# Patient Record
Sex: MALE | Race: WHITE
[De-identification: names, ages, dates, MRNs, and addresses within clinical notes are randomized per-mention and may not be internally consistent; named-entity substitution may affect disease eponyms.]

---

## 2018-12-29 ENCOUNTER — HOSPITAL ENCOUNTER (EMERGENCY)
Dept: HOSPITAL 25 - UCEAST | Age: 38
Discharge: HOME | End: 2018-12-29
Payer: COMMERCIAL

## 2018-12-29 VITALS — SYSTOLIC BLOOD PRESSURE: 145 MMHG | DIASTOLIC BLOOD PRESSURE: 80 MMHG

## 2018-12-29 DIAGNOSIS — J32.9: ICD-10-CM

## 2018-12-29 DIAGNOSIS — J02.9: Primary | ICD-10-CM

## 2018-12-29 PROCEDURE — 99212 OFFICE O/P EST SF 10 MIN: CPT

## 2018-12-29 PROCEDURE — G0463 HOSPITAL OUTPT CLINIC VISIT: HCPCS

## 2018-12-29 NOTE — UC
Throat Pain/Nasal Amandeep HPI





- HPI Summary


HPI Summary: 


38-year-old male comes to clinic today with a chief complaint of upper 

respiratory tract infection and sinusitis symptoms for 2 weeks and increasing 

sore throat the last 2 days.  Rhinorrhea is yellow.  He does have sinus 

pressure.  His wife and 1 child tested positive for strep the last 2 days.  No 

cough or chest congestion.  Over-the-counter medicines help with the symptoms.





- History of Current Complaint


Chief Complaint: UCGeneralIllness


Stated Complaint: SORE THROAT


Time Seen by Provider: 12/29/18 14:55


Pain Intensity: 2





- Allergies/Home Medications


Allergies/Adverse Reactions: 


 Allergies











Allergy/AdvReac Type Severity Reaction Status Date / Time


 


No Known Allergies Allergy   Verified 12/29/18 14:51











Home Medications: 


 Home Medications





Ibuprofen TAB* [Motrin TAB* 400 MG] 400 mg PO Q6H PRN 12/29/18 [History 

Confirmed 12/29/18]


Naproxen Sodium [Aleve] 220 mg PO Q12H 12/29/18 [History Confirmed 12/29/18]


Pseudoephedrine TAB* [Sudafed TAB*] 60 mg PO BID PRN 12/29/18 [History 

Confirmed 12/29/18]











PMH/Surg Hx/FS Hx/Imm Hx


Previously Healthy: Yes





- Surgical History


Surgical History: Yes


Surgery Procedure, Year, and Place: ankle repair





- Family History


Known Family History: Positive: Non-Contributory





- Social History


Alcohol Use: Daily


Substance Use Type: None


Smoking Status (MU): Never Smoked Tobacco





Review of Systems


All Other Systems Reviewed And Are Negative: Yes


Constitutional: Positive: Negative


Skin: Positive: Negative


Eyes: Positive: Negative


ENT: Positive: Sore Throat, Nasal Discharge, Sinus Congestion, Sinus Pain/

Tenderness


Respiratory: Positive: Negative


Cardiovascular: Positive: Negative


Gastrointestinal: Positive: Negative


Motor: Positive: Negative


Neurovascular: Positive: Negative


Musculoskeletal: Positive: Negative


Neurological: Positive: Negative


Psychological: Positive: Negative


Is Patient Immunocompromised?: No





Physical Exam


Triage Information Reviewed: Yes


Appearance: No Pain Distress, Well-Nourished, Ill-Appearing - MILD


Vital Signs: 


 Initial Vital Signs











Temp  98.5 F   12/29/18 14:46


 


Pulse  78   12/29/18 14:46


 


Resp  16   12/29/18 14:46


 


BP  145/80   12/29/18 14:46


 


Pulse Ox  98   12/29/18 14:46











Vital Signs Reviewed: Yes


Eye Exam: Normal


Eyes: Positive: Conjunctiva Clear


ENT: Positive: Pharyngeal erythema, Nasal congestion, Nasal drainage, TMs normal


Neck exam: Normal


Neck: Positive: Supple


Respiratory: Positive: Lungs clear, Normal breath sounds, No respiratory 

distress


Cardiovascular: Positive: RRR


Musculoskeletal Exam: Normal


Musculoskeletal: Positive: Strength Intact, ROM Intact


Neurological Exam: Normal


Neurological: Positive: Alert, Muscle Tone Normal


Psychological Exam: Normal


Psychological: Positive: Age Appropriate Behavior


Skin Exam: Normal





Throat Pain/Nasal Course/Dx





- Differential Dx/Diagnosis


Provider Diagnosis: 


 Sinusitis, Pharyngitis








Discharge





- Sign-Out/Discharge


Documenting (check all that apply): Patient Departure


All imaging exams completed and their final reports reviewed: No Studies





- Discharge Plan


Condition: Stable


Disposition: HOME


Prescriptions: 


Amoxicillin/Clavulanate TAB* [Augmentin *] 875 mg PO BID #20 tab


Patient Education Materials:  Pharyngitis (ED), Sinusitis (ED)


Referrals: 


Jose Tyler MD [Primary Care Provider] - 


Additional Instructions: 


FOLLOW UP WITH YOUR DOCTOR IF NOT COMPLETELY IMPROVED.


GET RECHECKED FOR ANY WORSENING OF YOUR CONDITION OR QUESTIONS OR CONCERNS.








- Billing Disposition and Condition


Condition: STABLE


Disposition: Home